# Patient Record
Sex: MALE | Race: WHITE
[De-identification: names, ages, dates, MRNs, and addresses within clinical notes are randomized per-mention and may not be internally consistent; named-entity substitution may affect disease eponyms.]

---

## 2017-01-09 ENCOUNTER — HOSPITAL ENCOUNTER (EMERGENCY)
Dept: HOSPITAL 25 - UCEAST | Age: 47
Discharge: ANOTHER HOSPITAL | End: 2017-01-09
Payer: COMMERCIAL

## 2017-01-09 ENCOUNTER — HOSPITAL ENCOUNTER (EMERGENCY)
Dept: HOSPITAL 25 - ED | Age: 47
Discharge: HOME | End: 2017-01-09
Payer: COMMERCIAL

## 2017-01-09 VITALS — SYSTOLIC BLOOD PRESSURE: 103 MMHG | DIASTOLIC BLOOD PRESSURE: 61 MMHG

## 2017-01-09 VITALS — DIASTOLIC BLOOD PRESSURE: 87 MMHG | SYSTOLIC BLOOD PRESSURE: 142 MMHG

## 2017-01-09 DIAGNOSIS — R06.02: ICD-10-CM

## 2017-01-09 DIAGNOSIS — R20.0: ICD-10-CM

## 2017-01-09 DIAGNOSIS — R07.89: Primary | ICD-10-CM

## 2017-01-09 DIAGNOSIS — F41.9: ICD-10-CM

## 2017-01-09 DIAGNOSIS — R20.2: ICD-10-CM

## 2017-01-09 DIAGNOSIS — R07.9: Primary | ICD-10-CM

## 2017-01-09 LAB
ALBUMIN SERPL BCG-MCNC: 4.3 G/DL (ref 3.2–5.2)
ALP SERPL-CCNC: 65 U/L (ref 34–104)
ALT SERPL W P-5'-P-CCNC: 53 U/L (ref 7–52)
ANION GAP SERPL CALC-SCNC: 7 MMOL/L (ref 2–11)
AST SERPL-CCNC: 37 U/L (ref 13–39)
BUN SERPL-MCNC: 14 MG/DL (ref 6–24)
BUN/CREAT SERPL: 12.3 (ref 8–20)
CALCIUM SERPL-MCNC: 9.5 MG/DL (ref 8.6–10.3)
CHLORIDE SERPL-SCNC: 103 MMOL/L (ref 101–111)
GLOBULIN SER CALC-MCNC: 2.6 G/DL (ref 2–4)
GLUCOSE SERPL-MCNC: 88 MG/DL (ref 70–100)
HCO3 SERPL-SCNC: 27 MMOL/L (ref 22–32)
HCT VFR BLD AUTO: 49 % (ref 42–52)
HGB BLD-MCNC: 16.3 G/DL (ref 14–18)
MCH RBC QN AUTO: 30 PG (ref 27–31)
MCHC RBC AUTO-ENTMCNC: 34 G/DL (ref 31–36)
MCV RBC AUTO: 88 FL (ref 80–94)
POTASSIUM SERPL-SCNC: 4.2 MMOL/L (ref 3.5–5)
PROT SERPL-MCNC: 6.9 G/DL (ref 6.4–8.9)
RBC # BLD AUTO: 5.52 10^6/UL (ref 4–5.4)
SODIUM SERPL-SCNC: 137 MMOL/L (ref 133–145)
TROPONIN I SERPL-MCNC: 0.01 NG/ML (ref ?–0.04)
WBC # BLD AUTO: 7.1 10^3/UL (ref 3.5–10.8)

## 2017-01-09 PROCEDURE — 83605 ASSAY OF LACTIC ACID: CPT

## 2017-01-09 PROCEDURE — G0463 HOSPITAL OUTPT CLINIC VISIT: HCPCS

## 2017-01-09 PROCEDURE — 85025 COMPLETE CBC W/AUTO DIFF WBC: CPT

## 2017-01-09 PROCEDURE — 36415 COLL VENOUS BLD VENIPUNCTURE: CPT

## 2017-01-09 PROCEDURE — 80053 COMPREHEN METABOLIC PANEL: CPT

## 2017-01-09 PROCEDURE — 99213 OFFICE O/P EST LOW 20 MIN: CPT

## 2017-01-09 PROCEDURE — 99283 EMERGENCY DEPT VISIT LOW MDM: CPT

## 2017-01-09 PROCEDURE — 71010: CPT

## 2017-01-09 PROCEDURE — 84484 ASSAY OF TROPONIN QUANT: CPT

## 2017-01-09 PROCEDURE — 93005 ELECTROCARDIOGRAM TRACING: CPT

## 2017-01-09 NOTE — UC
Cardiac HPI





- HPI Summary


HPI Summary: 





patient comes in with chest pressure and pain on left side, complains of left 

arm numbness and tingling. some tingling along the jaw line as well. on and off 

for the past few days. HX of anxiety but states this is different. EKG has some 

abnormalities. 





- History of Current Complaint


Chief Complaint: UCChestPain


Stated Complaint: CHEST PAIN


Time Seen by Provider: 01/09/17 16:50


Hx Obtained From: Patient


Onset/Duration: Sudden Onset, Lasting Days


Timing: Intermittent Episodes Lasting: - on and off for a few days


Initial Severity: Mild


Current Severity: Moderate


Pain Intensity: 6


Chest Pain Location: Left Anterior, Left Lateral


Character: Pressure/Squeezing


Aggravating: Nothing


Alleviating: Nothing


Associated Signs & Symptoms: Positive: Chest Pain, Anxiety, Numbness, Tingling





- Risk Factors


Pulmonary Embolism Risk Factors: Negative


Cardiac Risk Factors: Hypertension, Family History


Atrial Fibrillation: Negative


TAD Risk Factors: Negative


AMI/ACS Risk Factors: Obesity, Dyslipidemia





- Allergy/Home Medications


Allergies/Adverse Reactions: 


 Allergies











Allergy/AdvReac Type Severity Reaction Status Date / Time


 


No Known Allergies Allergy   Verified 12/19/16 10:01














PMH/Surg Hx/FS Hx/Imm Hx


Previously Healthy: Yes


Endocrine History Of: 


   Denies: Diabetes, Thyroid Disease


Cardiovascular History Of: 


   Denies: Cardiac Disorders - negative cardiac work up, Hypertension, Pacemaker

/ICD


Respiratory History Of: 


   Denies: COPD, Asthma


GI/ History Of: Reports: Kidney Stones


   Denies: Ulcer


Neurological History Of: Reports: Migraine


Psychological History Of: Reports: Anxiety, Depression





- Surgical History


Surgical History: Yes


Surgery Procedure, Year, and Place: left knee arthoscopy; wisdom teeth; left 

ureteral stent for renal calculus.  ***METALLIC ARTIFACT ADJACENT TO RT FRONTAL 

BONE (SKULL) CLEARED.  ***BY DR HERNANDEZ VIA SKULL X-RAYS 9/29/15.





- Family History


Known Family History: Positive: Cardiac Disease, Hypertension





- Social History


Alcohol Use: None


Substance Use Type: None


Smoking Status (MU): Never Smoked Tobacco


Have You Smoked in the Last Year: No





- Immunization History


Most Recent Influenza Vaccination: 10/2013


Most Recent Tetanus Shot: 10 years ago


Most Recent Pneumonia Vaccination: none





Review of Systems


Constitutional: Negative


Skin: Negative


Eyes: Negative


ENT: Negative


Respiratory: Negative


Cardiovascular: Chest Pain


Gastrointestinal: Negative


Genitourinary: Negative


Motor: Negative


Neurovascular: Negative


Musculoskeletal: Negative


Neurological: Paresthesia - lef side of jaw line and left arm


Psychological: Anxious


All Other Systems Reviewed And Are Negative: Yes





Physical Exam


Triage Information Reviewed: Yes


Appearance: Ill-Appearing, Pain Distress, Obese


Vital Signs: 


 Initial Vital Signs











Temp  98.3 F   01/09/17 16:38


 


Pulse  89   01/09/17 16:38


 


Resp  18   01/09/17 16:38


 


BP  142/87   01/09/17 16:38


 


Pulse Ox  96   01/09/17 16:38











Vital Signs Reviewed: Yes


Eye Exam: Normal


Eyes: Positive: Conjunctiva Clear


ENT Exam: Normal


ENT: Positive: Normal ENT inspection, Pharynx normal, TMs normal


Dental Exam: Normal


Neck exam: Normal


Neck: Positive: Supple, Nontender, No Lymphadenopathy


Respiratory Exam: Normal


Respiratory: Positive: Chest non-tender, Lungs clear, Normal breath sounds


Cardiovascular Exam: Normal


Cardiovascular: Positive: RRR, No Murmur, Pulses Normal


Abdominal Exam: Normal


Abdomen Description: Positive: Nontender, No Organomegaly, Soft


Bowel Sounds: Positive: Present


Musculoskeletal Exam: Normal


Musculoskeletal: Positive: Strength Intact, ROM Intact, No Edema


Neurological Exam: Normal


Neurological: Positive: Alert, Muscle Tone Normal


Psychological Exam: Normal


Skin Exam: Normal - Pedal pulses +2,


Skin: Positive: Other - slightly clammy noted on palpation





- Assessment/Plan


Course Of Treatment: hx obtained, exam performed, ekg obtained, 4 ASA 81 given, 

patient agreed to go by ambulance to [facility] ER. dr ramirez also consulted and she 

is in agreement. she called  to give report to ER.





- Differential Diagnoses - Chest Pain


Differential Diagnosis/HQI/PQRI: Acute MI, Aortic Aneurysm, Chest Wall, Lower 

Respiratory Infection, Pulmonary Edema





- Differential Diagnoses - Hypertension


Differential Diagnosis/HQI PQRI: Hypertension





- Differential Diagnoses - Palpitations


Differential Diagnosis/HQI/PQRI: Coronary Artery Disease, Panic Disorder





- Clinical Impression


Provider Diagnoses: Left sided chest pain.  anxiety





Discharge





- Discharge Plan


Condition: Stable


Disposition: TRANS [facility] CARE FAC


Referrals: 


Valentin Grewal MD [Primary Care Provider] -

## 2017-01-09 NOTE — RAD
Indication: Left leg pain.



Duplex Doppler sonography of the deep venous system of the left lower extremity deep

venous system was performed.



Bilaterally the common femoral veins appear patent and compressible. Left proximal greater

saphenous vein, proximal deep femoral vein, femoral vein, popliteal vein, posterior tibial

veins and peroneal veins appear patent and compressible.   



IMPRESSION: NO EVIDENCE OF DEEP VENOUS THROMBOSIS IS IDENTIFIED.

## 2017-01-09 NOTE — RAD
Indication: Chest pain.



Single frontal view of the chest performed at 1836 hours was reviewed.



Comparison is made with previous exam dated September 29, 2015.



No mediastinal shift is noted. Heart is of normal size and configuration. Lung fields

appear clear.



IMPRESSION: NO ACTIVE CARDIOPULMONARY DISEASE IS NOTED.

## 2017-01-09 NOTE — ED
Robert HATHAWAY Michael, scribed for Kylee Velazquez MD on 17 at 1822 .





HPI Chest Pain





- HPI Summary


HPI Summary: 





47 y/o male was BIBA from [facility] due to chest pain that started this 

afternoon at 1200. The pt describes the constant chest pain as throbbing that 

radiates down his LLE. He also states the pain was worse at 1630 as an 8 out of 

10 on a pain scale and is aggravated by nothing. Currently, the pain is at a 6 

out of 10 on the pain scale. He also c/o numbness in the hands and toes of the 

LLE. The pt is positive for 3 episodes of LLE cramping for the past 3 weeks and 

SOB. The pt has a hx of panic attacks.   





- History of Current Complaint


Chief Complaint: EDChestPainROMI


Time Seen by Provider: 17 17:42


Hx Obtained From: Patient, EMS, Medical Records


Onset/Duration: Started Hours Ago, Still Present


Time of Onset: 12:00


Timing: Constant, Lasting Hours


Initial Severity: Moderate


Current Severity: Moderate


Pain Intensity: 6


Pain Scale Used: 0-10 Numeric


Chest Pain Location: Left Lateral


Chest Pain Radiates: Yes


Chest Pain Radiates To:: Arm - LUE


Character: Other: - throbbing


Aggravating Factor(s): Nothing


Associated Signs and Symptoms: Positive: Chest Pain, Numbness, Shortness of 

Breath, Other: - LLE cramping





- Allergy/Home Medications


Allergies/Adverse Reactions: 


 Allergies











Allergy/AdvReac Type Severity Reaction Status Date / Time


 


No Known Allergies Allergy   Verified 16 10:01














PMH/Surg Hx/FS Hx/Imm Hx


Endocrine/Hematology History: 


   Denies: Hx Diabetes, Hx Thyroid Disease, Hx Anemia, Hx Unexplained Bleeding


Cardiovascular History: Reports: Hx Angina, Hx Syncope, Other Cardiovascular 

Problems/Disorders - cardiac cath  - no blockages


   Denies: Hx Aneurysm, Hx Embolism, Hx Hypercholesterolemia, Hx Hypotension, 

Hx Hypertension, Hx Pacemaker/ICD, Hx Peripheral Vascular Disease, Hx Rheumatic 

Fever, Hx Valvular Heart Disease


Respiratory History: Reports: Hx Sleep Apnea


   Denies: Hx Asthma, Hx Chronic Obstructive Pulmonary Disease (COPD)


GI History: Reports: Hx Gastroesophageal Reflux Disease


   Denies: Hx Ulcer


 History: Reports: Hx Kidney Stones


Musculoskeletal History: Reports: Hx Back Problems, Hx Gout


Sensory History: Reports: Other Sensory Impairments - finger tingling histoy 

carpal tunnel


   Denies: Hx Hearing Aid


Opthamlomology History: Reports: Other Sensory Impairments - finger tingling 

histoy carpal tunnel


Neurological History: Reports: Hx Migraine, Other Neuro Impairments/Disorders - 

PAIN CLINIC PATIENT


Psychiatric History: Reports: Hx Anxiety, Hx Depression


   Denies: Hx Panic Disorder





- Surgical History


Surgery Procedure, Year, and Place: left knee arthoscopy; wisdom teeth; left 

ureteral stent for renal calculus.  ***METALLIC ARTIFACT ADJACENT TO RT FRONTAL 

BONE (SKULL) CLEARED.  ***BY DR HERNANDEZ VIA SKULL X-RAYS 9/29/15.


Hx Anesthesia Reactions: No





- Immunization History


Date of Tetanus Vaccine: Unsure


Date of Influenza Vaccine: 


Infectious Disease History: No


Infectious Disease History: 


   Denies: Hx Hepatitis, Hx Human Immunodeficiency Virus (HIV), Traveled 

Outside the [place] in Last 30 Days





- Family History


Known Family History: 


   Negative: Cardiac Disease, Hypertension





- Social History


Occupation: Employed Full-time


Lives: With Family


Alcohol Use: None


Hx Substance Use: No


Substance Use Type: Reports: None


Hx Tobacco Use: No


Smoking Status (MU): Never Smoked Tobacco


Have You Smoked in the Last Year: No





Review of Systems


Negative: Fever


Positive: Chest Pain


Positive: Shortness Of Breath


Positive: Other - LLE cramping


Positive: Numbness


All Other Systems Reviewed And Are Negative: Yes





Physical Exam


Triage Information Reviewed: Yes


Vital Signs On Initial Exam: 


 Initial Vitals











Temp Pulse Resp BP Pulse Ox


 


 99.6 F   80   16   128/82   95 


 


 17 17:37  17 17:37  17 17:37  17 17:37  17 17:37











Vital Signs Reviewed: Yes


Appearance: Positive: Well-Appearing, No Pain Distress


Skin: Positive: Warm, Skin Color Reflects Adequate Perfusion, Dry


Eyes: Positive: EOMI, CHRISTINA


ENT: Positive: Pharynx normal, TMs normal


Neck: Positive: Supple, Nontender


Respiratory/Lung Sounds: Positive: Clear to Auscultation, Breath Sounds 

Present.  Negative: Rales, Rhonchi, Wheezes


Cardiovascular: Positive: RRR, Other - no gallop.  Negative: Murmur, Rub


Abdomen Description: Positive: Nontender, Soft, Other: - no rebound.  Negative: 

Distended, Guarding


Bowel Sounds: Positive: Present


Musculoskeletal: Positive: Strength/ROM Intact.  Negative: Edema Left, Edema 

Right


Neurological: Positive: Sensory/Motor Intact, Alert, Oriented to Person Place, 

Time, CN Intact II-III


Psychiatric: Positive: Affect/Mood Appropriate





Diagnostics





- Vital Signs


 Vital Signs











  Temp Pulse Resp BP Pulse Ox


 


 17 17:37  99.6 F  80  16  128/82  95














- Laboratory


Lab Results: 


 Lab Results











  17 Range/Units





  17:45 17:45 17:45 


 


WBC  7.1    (3.5-10.8)  10^3/ul


 


RBC  5.52 H    (4.0-5.4)  10^6/ul


 


Hgb  16.3    (14.0-18.0)  g/dl


 


Hct  49    (42-52)  %


 


MCV  88    (80-94)  fL


 


MCH  30    (27-31)  pg


 


MCHC  34    (31-36)  g/dl


 


RDW  15    (10.5-15)  %


 


Plt Count  225    (150-450)  10^3/ul


 


MPV  8    (7.4-10.4)  um3


 


Neut % (Auto)  56.9    (38-83)  %


 


Lymph % (Auto)  35.0    (25-47)  %


 


Mono % (Auto)  6.7    (1-9)  %


 


Eos % (Auto)  0.8    (0-6)  %


 


Baso % (Auto)  0.6    (0-2)  %


 


Absolute Neuts (auto)  4.1    (1.5-7.7)  10^3/ul


 


Absolute Lymphs (auto)  2.5    (1.0-4.8)  10^3/ul


 


Absolute Monos (auto)  0.5    (0-0.8)  10^3/ul


 


Absolute Eos (auto)  0.1    (0-0.6)  10^3/ul


 


Absolute Basos (auto)  0    (0-0.2)  10^3/ul


 


Absolute Nucleated RBC  0.01    10^3/ul


 


Nucleated RBC %  0.2    


 


Sodium   137   (133-145)  mmol/L


 


Potassium   4.2   (3.5-5.0)  mmol/L


 


Chloride   103   (101-111)  mmol/L


 


Carbon Dioxide   27   (22-32)  mmol/L


 


Anion Gap   7   (2-11)  mmol/L


 


BUN   14   (6-24)  mg/dL


 


Creatinine   1.14   (0.67-1.17)  mg/dL


 


Est GFR ( Amer)   89.7   (>60)  


 


Est GFR (Non-Af Amer)   69.8   (>60)  


 


BUN/Creatinine Ratio   12.3   (8-20)  


 


Glucose   88   ()  mg/dL


 


Lactic Acid    1.2  (0.5-2.0)  mmol/L


 


Calcium   9.5   (8.6-10.3)  mg/dL


 


Total Bilirubin   0.40   (0.2-1.0)  mg/dL


 


AST   37   (13-39)  U/L


 


ALT   53 H   (7-52)  U/L


 


Alkaline Phosphatase   65   ()  U/L


 


Troponin I   0.01   (<0.04)  ng/mL


 


Total Protein   6.9   (6.4-8.9)  g/dL


 


Albumin   4.3   (3.2-5.2)  g/dL


 


Globulin   2.6   (2-4)  g/dL


 


Albumin/Globulin Ratio   1.7   (1-3)  














  17 Range/Units





  22:10 


 


WBC   (3.5-10.8)  10^3/ul


 


RBC   (4.0-5.4)  10^6/ul


 


Hgb   (14.0-18.0)  g/dl


 


Hct   (42-52)  %


 


MCV   (80-94)  fL


 


MCH   (27-31)  pg


 


MCHC   (31-36)  g/dl


 


RDW   (10.5-15)  %


 


Plt Count   (150-450)  10^3/ul


 


MPV   (7.4-10.4)  um3


 


Neut % (Auto)   (38-83)  %


 


Lymph % (Auto)   (25-47)  %


 


Mono % (Auto)   (1-9)  %


 


Eos % (Auto)   (0-6)  %


 


Baso % (Auto)   (0-2)  %


 


Absolute Neuts (auto)   (1.5-7.7)  10^3/ul


 


Absolute Lymphs (auto)   (1.0-4.8)  10^3/ul


 


Absolute Monos (auto)   (0-0.8)  10^3/ul


 


Absolute Eos (auto)   (0-0.6)  10^3/ul


 


Absolute Basos (auto)   (0-0.2)  10^3/ul


 


Absolute Nucleated RBC   10^3/ul


 


Nucleated RBC %   


 


Sodium   (133-145)  mmol/L


 


Potassium   (3.5-5.0)  mmol/L


 


Chloride   (101-111)  mmol/L


 


Carbon Dioxide   (22-32)  mmol/L


 


Anion Gap   (2-11)  mmol/L


 


BUN   (6-24)  mg/dL


 


Creatinine   (0.67-1.17)  mg/dL


 


Est GFR ( Amer)   (>60)  


 


Est GFR (Non-Af Amer)   (>60)  


 


BUN/Creatinine Ratio   (8-20)  


 


Glucose   ()  mg/dL


 


Lactic Acid   (0.5-2.0)  mmol/L


 


Calcium   (8.6-10.3)  mg/dL


 


Total Bilirubin   (0.2-1.0)  mg/dL


 


AST   (13-39)  U/L


 


ALT   (7-52)  U/L


 


Alkaline Phosphatase   ()  U/L


 


Troponin I  0.00  (<0.04)  ng/mL


 


Total Protein   (6.4-8.9)  g/dL


 


Albumin   (3.2-5.2)  g/dL


 


Globulin   (2-4)  g/dL


 


Albumin/Globulin Ratio   (1-3)  











Result Diagrams: 


 17 17:45





 17 17:45


Lab Statement: Any lab studies that have been ordered have been reviewed, and 

results considered in the medical decision making process.





- Radiology


  ** CXR


Xray Interpretation: No Acute Changes


Radiology Interpretation Completed By: Radiologist





- EKG


  ** EK


EKG Rhythm: Sinus Rhythm


ST Segment: Normal





- Additional Comments


Diagnostic Additional Comments: 





US:Radiologist-no evidence for DVT. 





Chest Pain Course/Dx





- Course


Course Of Treatment: 47 y/o male comes to the ED from [facility] c/o chest 

pain. He also c/o numbness in his hands and toes on his LLE. The pt also c/o 3 

epsisodes of LLE cramping for the last 3 weeks. His EKG-nml, CXR-nml, and US 

was nml.  No risk factors ok to go after 2nd trop (6 hours from worsened pain) 

which was zero





- Diagnoses


Provider Diagnoses: 


 Chest pain








Discharge





- Discharge Plan


Condition: Stable


Disposition: HOME


Patient Education Materials:  Chest Pain (ED)


Referrals: 


Valentin Grewal MD [Primary Care Provider] - 


Additional Instructions: 


you will follow up with Dr. Carmina within the next 2 days.





The documentation as recorded by the scribeRobert Michael accurately 

reflects the service I personally performed and the decisions made by me, 

Kylee Velazquez MD.

## 2017-01-10 NOTE — UC
I, Renato Castelan, scribed for Tana Kline MD on 01/09/17 at 1709 .





Progress





- Progress Note


Progress Note: 


Pt reported pressure pain over left side of heart since noon and rated it at a 5

-6/10. Thinks is coming from anxiety, which he has had before, but this feels a 

little different.  States the pain radiates to his jaw and his left arm.  

Nonsmoker, no prior hx CAD.  Risk factors: dyslipidemia, HTN, fam hx.  Pt 

advised of abnormal EKG with q in III, poor R wave progression V1V2.  Pt needs 

higher level of care and is [place] [facility] [place].





The documentation as recorded by the scribeFlip Karl accurately reflects 

the service I personally performed and the decisions made by me, Tana Kline MD.

## 2017-02-22 ENCOUNTER — HOSPITAL ENCOUNTER (EMERGENCY)
Dept: HOSPITAL 25 - ED | Age: 47
Discharge: HOME | End: 2017-02-22
Payer: COMMERCIAL

## 2017-02-22 VITALS — DIASTOLIC BLOOD PRESSURE: 89 MMHG | SYSTOLIC BLOOD PRESSURE: 109 MMHG

## 2017-02-22 DIAGNOSIS — S00.81XA: Primary | ICD-10-CM
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
## 2017-02-22 PROCEDURE — 99282 EMERGENCY DEPT VISIT SF MDM: CPT

## 2017-02-22 PROCEDURE — 70450 CT HEAD/BRAIN W/O DYE: CPT

## 2017-02-22 PROCEDURE — 70486 CT MAXILLOFACIAL W/O DYE: CPT

## 2017-02-22 NOTE — RAD
HISTORY: Trauma to right face



COMPARISONS: September 29, 2015



TECHNIQUE: Multiple contiguous axial CT scans were obtained of the head without 

intravenous contrast. 



FINDINGS: 

HEMORRHAGE/INFARCT: There is no hemorrhage or acute infarct.

MASSES/SHIFT: There is no mass or shift.



EXTRA-AXIAL SPACES: There are no extra-axial fluid collections.

SULCI AND VENTRICLES: The sulci and ventricles are normal in size and position for the

patient's stated age.



CEREBRUM: There are no focal parenchymal abnormalities.

BRAINSTEM: There are no focal parenchymal abnormalities.

CEREBELLUM: There are no focal parenchymal abnormalities.



VESSELS: The vessels are grossly normal.

PARANASAL SINUSES: The paranasal sinuses are clear.

ORBITS: The orbits are unremarkable.

BONES AND SOFT TISSUE: There is a small radial opaque foreign body in the subcutaneous

soft tissue along the infratemporal fossa on the right. This is stable from the 2015

examination.



OTHER: None



IMPRESSION: 

NO ACUTE INTRACRANIAL PATHOLOGY.

## 2017-02-22 NOTE — ED
Head Injury





- HPI Summary


HPI Summary: 


Patient was at work when a 3/8 inch chain on a piece of equipment snapped and 

recoiled, hitting him on the right side of his face. The force knocked him to 

the ground, but he did not loose consciousness. He denies HA, neck or jaw pain, 

and no loose teeth. He has an abrasion on his right cheek and has swelling 

around his ear. He has not taken any medication for pain.





- History Of Current Complaint


Chief Complaint: EDFacialInjury


Stated Complaint: FACIAL INJURY


Time Seen by Provider: 02/22/17 08:07


Hx Obtained From: Patient


Mechanism Of Injury: Blunt Trauma


Onset/Duration: Started Minutes Ago


Onset of Pain: Immediate


Severity Currently: Severe


Severity Initially: Moderate


Pain Intensity: 5


Location of Head Injury: Temporal - right


Character: Sharp, Aching


Aggravating Factor(s): Movement


Associated Signs And Symptoms: Other: - abrasion right cheek





- Allergies/Home Medications


Allergies/Adverse Reactions: 


 Allergies











Allergy/AdvReac Type Severity Reaction Status Date / Time


 


No Known Allergies Allergy   Verified 12/19/16 10:01














PMH/Surg Hx/FS Hx/Imm Hx


Endocrine/Hematology History: 


   Denies: Hx Diabetes, Hx Thyroid Disease, Hx Anemia, Hx Unexplained Bleeding


Cardiovascular History: Reports: Hx Angina, Hx Syncope, Other Cardiovascular 

Problems/Disorders - cardiac cath 2013 - no blockages


   Denies: Hx Aneurysm, Hx Embolism, Hx Hypercholesterolemia, Hx Hypotension, 

Hx Hypertension, Hx Pacemaker/ICD, Hx Peripheral Vascular Disease, Hx Rheumatic 

Fever, Hx Valvular Heart Disease


Respiratory History: Reports: Hx Sleep Apnea


   Denies: Hx Asthma, Hx Chronic Obstructive Pulmonary Disease (COPD)


GI History: Reports: Hx Gastroesophageal Reflux Disease


   Denies: Hx Ulcer


 History: Reports: Hx Kidney Stones


Musculoskeletal History: Reports: Hx Back Problems, Hx Gout


Sensory History: Reports: Other Sensory Impairments - finger tingling histoy 

carpal tunnel


   Denies: Hx Hearing Aid


Opthamlomology History: Reports: Other Sensory Impairments - finger tingling 

histoy carpal tunnel


Neurological History: Reports: Hx Migraine, Other Neuro Impairments/Disorders - 

PAIN CLINIC PATIENT


Psychiatric History: Reports: Hx Anxiety, Hx Depression


   Denies: Hx Panic Disorder





- Surgical History


Surgery Procedure, Year, and Place: left knee arthoscopy; wisdom teeth; left 

ureteral stent for renal calculus.  ***METALLIC ARTIFACT ADJACENT TO RT FRONTAL 

BONE (SKULL) CLEARED.  ***BY DR HERNANDEZ VIA SKULL X-RAYS 9/29/15.


Hx Anesthesia Reactions: No





- Immunization History


Date of Tetanus Vaccine: Unsure


Date of Influenza Vaccine: 2013


Infectious Disease History: No


Infectious Disease History: 


   Denies: Hx Hepatitis, Hx Human Immunodeficiency Virus (HIV), Traveled 

Outside the [place] in Last 30 Days





- Family History


Known Family History: Positive: None


   Negative: Cardiac Disease, Hypertension





- Social History


Occupation: Employed Full-time


Lives: With Family


Alcohol Use: None


Hx Substance Use: No


Substance Use Type: Reports: None


Hx Tobacco Use: No


Smoking Status (MU): Never Smoked Tobacco


Have You Smoked in the Last Year: No





Review of Systems


Negative: Photophobia, Blurred Vision, Diplopia


Positive: Edema - mild to right cheek


Positive: Other - minor abrasion to right cheek.  Negative: Bruising


Negative: Headache, Weakness, Paresthesia


All Other Systems Reviewed And Are Negative: Yes





Physical Exam


Triage Information Reviewed: Yes


Vital Signs On Initial Exam: 


 Initial Vitals











Temp Pulse Resp BP Pulse Ox


 


 98.9 F   84   20   127/82   96 


 


 02/22/17 07:57  02/22/17 07:57  02/22/17 07:57  02/22/17 07:57  02/22/17 07:57











Vital Signs Reviewed: Yes


Appearance: Positive: Well-Appearing, Pain Distress, Obese


Skin: Positive: Warm, Skin Color Reflects Adequate Perfusion, Dry, Tender - 

right cheek abrasion with mild edema over cheek and superior to right ear, Soft


Head/Face: Positive: Normal Head/Face Inspection


Eyes: Positive: EOMI, CHRISTINA, Conjunctiva Clear


ENT: Positive: Hearing grossly normal, Pharynx normal, TMs normal


Neck: Positive: Supple, Nontender, No Lymphadenopathy


Respiratory/Lung Sounds: Positive: Clear to Auscultation, Breath Sounds Present


Cardiovascular: Positive: RRR


Musculoskeletal: Positive: Strength/ROM Intact


Neurological: Positive: Sensory/Motor Intact, Alert, Oriented to Person Place, 

Time, CN Intact II-III, NV Bundle Intact Distally


Psychiatric: Positive: Affect/Mood Appropriate


AVPU Assessment: Alert





- [place] Coma Scale


Coma Scale Total: 15





Diagnostics





- Vital Signs


 Vital Signs











  Temp Pulse Resp BP Pulse Ox


 


 02/22/17 07:57  98.9 F  84  20  127/82  96














- Laboratory


Lab Statement: Any lab studies that have been ordered have been reviewed, and 

results considered in the medical decision making process.





- CT


  ** No standard instances


CT Interpretation: No Acute Changes - CT brain and CT maxillofacial negative 

for acute changes.


CT Interpretation Completed By: Radiologist





Head Injury Course/Dx





- Diagnoses


Differential Diagnosis/HQI/PQRI: Cerebral Contusion, Cervical Sprain, Hematoma, 

Intracranial Bleed, Laceration, Mandible Fracture, Skull Fracture


Provider Diagnoses: 


 Contusion of mandibular joint area








Discharge





- Discharge Plan


Condition: Stable


Disposition: HOME


Patient Education Materials:  Facial Contusion (ED)


Referrals: 


Valentin Grewal MD [Primary Care Provider] - 


Additional Instructions: 


Please use ibuprofen 600mg three times daily with meals for the next 3-5 days 

to decrease swelling and pain. Apply ice several times daily as needed to 

reduce swelling. Follow-up with your primary care provider if your symptoms do 

not begin to improve in the next 5-7 days. Return to the emergency department 

if symptoms worsen.

## 2017-02-22 NOTE — RAD
HISTORY: Right facial trauma



COMPARISONS: Head CT dated September 29, 2015



TECHNIQUE: Multiple contiguous axial CT scans were obtained of the face without

intravenous contrast, with coronal and sagittal multiplanar reformations.    



FINDINGS: 



BONES: There is no displaced fracture or dislocation. The orbital rim is intact. The

zygomatic arch is intact. The pterygoid plates are intact.



ORBITS: The globes are round. The optic nerves are symmetric. The extraocular musculature

is normal. There is no post septal or intraconal inflammatory change. There is no

retrobulbar hematoma.



PARANASAL SINUSES: The paranasal sinuses are clear.



BRAIN AND SOFT TISSUE: Again noted is a smaller opaque foreign body in the subcutaneous

soft tissue along the infratemporal fossa on the right, stable from previous examinations.



OTHER: None.



IMPRESSION: 

NO FACIAL FRACTURE

## 2017-07-07 ENCOUNTER — HOSPITAL ENCOUNTER (EMERGENCY)
Dept: HOSPITAL 25 - ED | Age: 47
Discharge: HOME | End: 2017-07-07
Payer: COMMERCIAL

## 2017-07-07 VITALS — SYSTOLIC BLOOD PRESSURE: 133 MMHG | DIASTOLIC BLOOD PRESSURE: 89 MMHG

## 2017-07-07 DIAGNOSIS — R11.0: ICD-10-CM

## 2017-07-07 DIAGNOSIS — R07.9: Primary | ICD-10-CM

## 2017-07-07 LAB
ALBUMIN SERPL BCG-MCNC: 4.2 G/DL (ref 3.2–5.2)
ALP SERPL-CCNC: 67 U/L (ref 34–104)
ALT SERPL W P-5'-P-CCNC: 58 U/L (ref 7–52)
ANION GAP SERPL CALC-SCNC: 7 MMOL/L (ref 2–11)
AST SERPL-CCNC: 48 U/L (ref 13–39)
BUN SERPL-MCNC: 13 MG/DL (ref 6–24)
BUN/CREAT SERPL: 12.3 (ref 8–20)
CALCIUM SERPL-MCNC: 9.2 MG/DL (ref 8.6–10.3)
CHLORIDE SERPL-SCNC: 104 MMOL/L (ref 101–111)
GLOBULIN SER CALC-MCNC: 2.8 G/DL (ref 2–4)
GLUCOSE SERPL-MCNC: 94 MG/DL (ref 70–100)
HCO3 SERPL-SCNC: 25 MMOL/L (ref 22–32)
HCT VFR BLD AUTO: 50 % (ref 42–52)
HGB BLD-MCNC: 16.5 G/DL (ref 14–18)
MCH RBC QN AUTO: 30 PG (ref 27–31)
MCHC RBC AUTO-ENTMCNC: 33 G/DL (ref 31–36)
MCV RBC AUTO: 91 FL (ref 80–94)
POTASSIUM SERPL-SCNC: 4 MMOL/L (ref 3.5–5)
PROT SERPL-MCNC: 7 G/DL (ref 6.4–8.9)
RBC # BLD AUTO: 5.48 10^6/UL (ref 4–5.4)
SODIUM SERPL-SCNC: 136 MMOL/L (ref 133–145)
TROPONIN I SERPL-MCNC: 0 NG/ML (ref ?–0.04)
WBC # BLD AUTO: 7.2 10^3/UL (ref 3.5–10.8)

## 2017-07-07 PROCEDURE — 80053 COMPREHEN METABOLIC PANEL: CPT

## 2017-07-07 PROCEDURE — 71010: CPT

## 2017-07-07 PROCEDURE — 85379 FIBRIN DEGRADATION QUANT: CPT

## 2017-07-07 PROCEDURE — 84484 ASSAY OF TROPONIN QUANT: CPT

## 2017-07-07 PROCEDURE — 93005 ELECTROCARDIOGRAM TRACING: CPT

## 2017-07-07 PROCEDURE — 99284 EMERGENCY DEPT VISIT MOD MDM: CPT

## 2017-07-07 PROCEDURE — 36415 COLL VENOUS BLD VENIPUNCTURE: CPT

## 2017-07-07 PROCEDURE — 83605 ASSAY OF LACTIC ACID: CPT

## 2017-07-07 PROCEDURE — 85025 COMPLETE CBC W/AUTO DIFF WBC: CPT

## 2017-07-07 NOTE — RAD
Indication: Chest pain.



Single frontal view of the chest performed at 1500 hours was reviewed.



Comparison is made with previous exam dated January 09, 2017.



No mediastinal shift is noted. Heart is of normal size and configuration. Lung fields

appear clear.



IMPRESSION: NO ACTIVE CARDIOPULMONARY DISEASE IS NOTED.

## 2017-07-07 NOTE — ED
Guillermina HATHAWAY Salem, scribed for Umesh Rangel MD on 07/07/17 at 1442 .





HPI Chest Pain





- HPI Summary


HPI Summary: 





Patient is a 45 y/o M who presents to the ED with intermittent burning and 

occasionally sharp, left-sided CP since noon today. He reports nausea PTA, but 

denies diaphoresis or SOB. He states that pain began after he ate his lunch 

which was a salad and typical for him. Pt also states that he has a hx of 

anxiety, but that sx are dissimilar. He reports he has been trying to lose 

weight so has been running on the treadmill and lifting weights daily (

including yesterday). 





- History of Current Complaint


Chief Complaint: EDChestPainROMI


Time Seen by Provider: 07/07/17 14:21


Hx Obtained From: Patient


Onset/Duration: Started Hours Ago, Atraumatic, Still Present


Timing: Intermittent


Initial Severity: Moderate


Current Severity: Moderate


Pain Intensity: 5


Pain Scale Used: 0-10 Numeric


Chest Pain Location: Left Anterior


Chest Pain Radiates: No


Character: Burning, Sharp/Stabbing


Aggravating Factor(s): Nothing


Alleviating Factor(s): Nothing


Associated Signs and Symptoms: Positive: Chest Pain, Nausea.  Negative: 

Shortness of Breath, Diaphoresis





- Allergy/Home Medications


Allergies/Adverse Reactions: 


 Allergies











Allergy/AdvReac Type Severity Reaction Status Date / Time


 


No Known Allergies Allergy   Verified 12/19/16 10:01














PMH/Surg Hx/FS Hx/Imm Hx


Endocrine/Hematology History: 


   Denies: Hx Diabetes, Hx Thyroid Disease, Hx Anemia, Hx Unexplained Bleeding


Cardiovascular History: Reports: Hx Angina, Hx Syncope, Other Cardiovascular 

Problems/Disorders - cardiac cath 2013 - no blockages


   Denies: Hx Aneurysm, Hx Embolism, Hx Hypercholesterolemia, Hx Hypotension, 

Hx Hypertension, Hx Pacemaker/ICD, Hx Peripheral Vascular Disease, Hx Rheumatic 

Fever, Hx Valvular Heart Disease


Respiratory History: Reports: Hx Sleep Apnea


   Denies: Hx Asthma, Hx Chronic Obstructive Pulmonary Disease (COPD)


GI History: Reports: Hx Gastroesophageal Reflux Disease


   Denies: Hx Ulcer


 History: Reports: Hx Kidney Stones


Musculoskeletal History: Reports: Hx Back Problems, Hx Gout


Sensory History: Reports: Other Sensory Impairments - finger tingling histoy 

carpal tunnel


   Denies: Hx Hearing Aid


Opthamlomology History: Reports: Other Sensory Impairments - finger tingling 

histoy carpal tunnel


Neurological History: Reports: Hx Migraine, Other Neuro Impairments/Disorders - 

PAIN CLINIC PATIENT


Psychiatric History: Reports: Hx Anxiety, Hx Depression


   Denies: Hx Panic Disorder





- Surgical History


Surgery Procedure, Year, and Place: left knee arthoscopy; wisdom teeth; left 

ureteral stent for renal calculus.  ***METALLIC ARTIFACT ADJACENT TO RT FRONTAL 

BONE (SKULL) CLEARED.  ***BY DR HERNANDEZ VIA SKULL X-RAYS 9/29/15.


Hx Anesthesia Reactions: No





- Immunization History


Date of Tetanus Vaccine: Unsure


Date of Influenza Vaccine: 2013


Infectious Disease History: No


Infectious Disease History: 


   Denies: Hx Hepatitis, Hx Human Immunodeficiency Virus (HIV), Traveled 

Outside the [place] in Last 30 Days





- Family History


Known Family History: 


   Negative: Cardiac Disease, Hypertension





- Social History


Alcohol Use: Occasionally


Hx Substance Use: No


Substance Use Type: Reports: None


Hx Tobacco Use: No


Smoking Status (MU): Never Smoked Tobacco


Have You Smoked in the Last Year: No





Review of Systems


Negative: Skin Diaphoresis


Positive: Chest Pain


Negative: Shortness Of Breath


Positive: Nausea


All Other Systems Reviewed And Are Negative: Yes





Physical Exam


Triage Information Reviewed: Yes


Vital Signs On Initial Exam: 


 Initial Vitals











Temp Pulse Resp BP Pulse Ox


 


 97.9 F   94   20   153/83   96 


 


 07/07/17 13:43  07/07/17 13:43  07/07/17 13:43  07/07/17 13:43  07/07/17 13:43











Vital Signs Reviewed: Yes


Appearance: Positive: Well-Appearing, No Pain Distress, Obese


Skin: Positive: Warm, Skin Color Reflects Adequate Perfusion, Dry


Head/Face: Positive: Normal Head/Face Inspection


Eyes: Positive: Normal


Neck: Positive: Supple, Nontender


Respiratory/Lung Sounds: Positive: Clear to Auscultation, Breath Sounds Present


Cardiovascular: Positive: RRR


Abdomen Description: Positive: Nontender, Soft


Bowel Sounds: Positive: Present


Musculoskeletal: Positive: Normal


Neurological: Positive: Normal


Psychiatric: Positive: Normal, Affect/Mood Appropriate





- Dwale Coma Scale


Coma Scale Total: 15





Diagnostics





- Vital Signs


 Vital Signs











  Temp Pulse Resp BP Pulse Ox


 


 07/07/17 14:30    21  130/72 


 


 07/07/17 14:29     127/67 


 


 07/07/17 13:46  97.9 F  94  20  153/83  97


 


 07/07/17 13:43  97.9 F  94  20  153/83  96














- Laboratory


Lab Results: 


 Lab Results











  07/07/17 07/07/17 07/07/17 Range/Units





  14:30 14:30 14:30 


 


WBC  7.2    (3.5-10.8)  10^3/ul


 


RBC  5.48 H    (4.0-5.4)  10^6/ul


 


Hgb  16.5    (14.0-18.0)  g/dl


 


Hct  50    (42-52)  %


 


MCV  91    (80-94)  fL


 


MCH  30    (27-31)  pg


 


MCHC  33    (31-36)  g/dl


 


RDW  15    (10.5-15)  %


 


Plt Count  205    (150-450)  10^3/ul


 


MPV  8    (7.4-10.4)  um3


 


Neut % (Auto)  70.5    (38-83)  %


 


Lymph % (Auto)  22.5 L    (25-47)  %


 


Mono % (Auto)  6.4    (1-9)  %


 


Eos % (Auto)  0.4    (0-6)  %


 


Baso % (Auto)  0.2    (0-2)  %


 


Absolute Neuts (auto)  5.1    (1.5-7.7)  10^3/ul


 


Absolute Lymphs (auto)  1.6    (1.0-4.8)  10^3/ul


 


Absolute Monos (auto)  0.5    (0-0.8)  10^3/ul


 


Absolute Eos (auto)  0    (0-0.6)  10^3/ul


 


Absolute Basos (auto)  0    (0-0.2)  10^3/ul


 


Absolute Nucleated RBC  0.01    10^3/ul


 


Nucleated RBC %  0.1    


 


D-Dimer, Quantitative   < 200   (Less Than 230)  ng/mL


 


Sodium    136  (133-145)  mmol/L


 


Potassium    4.0  (3.5-5.0)  mmol/L


 


Chloride    104  (101-111)  mmol/L


 


Carbon Dioxide    25  (22-32)  mmol/L


 


Anion Gap    7  (2-11)  mmol/L


 


BUN    13  (6-24)  mg/dL


 


Creatinine    1.06  (0.67-1.17)  mg/dL


 


Est GFR ( Amer)    97.6  (>60)  


 


Est GFR (Non-Af Amer)    75.9  (>60)  


 


BUN/Creatinine Ratio    12.3  (8-20)  


 


Glucose    94  ()  mg/dL


 


Lactic Acid     (0.5-2.0)  mmol/L


 


Calcium    9.2  (8.6-10.3)  mg/dL


 


Total Bilirubin    0.80  (0.2-1.0)  mg/dL


 


AST    48 H  (13-39)  U/L


 


ALT    58 H  (7-52)  U/L


 


Alkaline Phosphatase    67  ()  U/L


 


Troponin I    0.00  (<0.04)  ng/mL


 


Total Protein    7.0  (6.4-8.9)  g/dL


 


Albumin    4.2  (3.2-5.2)  g/dL


 


Globulin    2.8  (2-4)  g/dL


 


Albumin/Globulin Ratio    1.5  (1-3)  














  07/07/17 07/07/17 Range/Units





  14:30 17:30 


 


WBC    (3.5-10.8)  10^3/ul


 


RBC    (4.0-5.4)  10^6/ul


 


Hgb    (14.0-18.0)  g/dl


 


Hct    (42-52)  %


 


MCV    (80-94)  fL


 


MCH    (27-31)  pg


 


MCHC    (31-36)  g/dl


 


RDW    (10.5-15)  %


 


Plt Count    (150-450)  10^3/ul


 


MPV    (7.4-10.4)  um3


 


Neut % (Auto)    (38-83)  %


 


Lymph % (Auto)    (25-47)  %


 


Mono % (Auto)    (1-9)  %


 


Eos % (Auto)    (0-6)  %


 


Baso % (Auto)    (0-2)  %


 


Absolute Neuts (auto)    (1.5-7.7)  10^3/ul


 


Absolute Lymphs (auto)    (1.0-4.8)  10^3/ul


 


Absolute Monos (auto)    (0-0.8)  10^3/ul


 


Absolute Eos (auto)    (0-0.6)  10^3/ul


 


Absolute Basos (auto)    (0-0.2)  10^3/ul


 


Absolute Nucleated RBC    10^3/ul


 


Nucleated RBC %    


 


D-Dimer, Quantitative    (Less Than 230)  ng/mL


 


Sodium    (133-145)  mmol/L


 


Potassium    (3.5-5.0)  mmol/L


 


Chloride    (101-111)  mmol/L


 


Carbon Dioxide    (22-32)  mmol/L


 


Anion Gap    (2-11)  mmol/L


 


BUN    (6-24)  mg/dL


 


Creatinine    (0.67-1.17)  mg/dL


 


Est GFR ( Amer)    (>60)  


 


Est GFR (Non-Af Amer)    (>60)  


 


BUN/Creatinine Ratio    (8-20)  


 


Glucose    ()  mg/dL


 


Lactic Acid  0.7   (0.5-2.0)  mmol/L


 


Calcium    (8.6-10.3)  mg/dL


 


Total Bilirubin    (0.2-1.0)  mg/dL


 


AST    (13-39)  U/L


 


ALT    (7-52)  U/L


 


Alkaline Phosphatase    ()  U/L


 


Troponin I   0.00  (<0.04)  ng/mL


 


Total Protein    (6.4-8.9)  g/dL


 


Albumin    (3.2-5.2)  g/dL


 


Globulin    (2-4)  g/dL


 


Albumin/Globulin Ratio    (1-3)  











Result Diagrams: 


 07/07/17 14:30





 07/07/17 14:30


Diagnostic Studies Comment: Trop: 0.00.  Trop: 0.00


Lab Statement: Any lab studies that have been ordered have been reviewed, and 

results considered in the medical decision making process.





- Radiology


  ** CXR


Radiology Interpretation Completed By: Radiologist - IMPRESSION: NO ACTIVE 

CARDIOPULMONARY DISEASE IS NOTED.





- EKG


  ** 1355


EKG Interpretation: NSR @ 86 bpm. 





Re-Evaluation





- Re-Evaluation


  ** First Eval


Re-Evaluation Time: 17:32


Comment: Updated pt.





Chest Pain Course/Dx





- Course


Course Of Treatment: Mr. Pfeiffer presented with leftsided chest pain that started 

2 hours PTA.  He was worked up for CP including a d-dimer and two troponins and 

was D/C'd to F/U.





- Diagnoses


Provider Diagnoses: 


 Chest pain








Discharge





- Discharge Plan


Condition: Stable


Disposition: HOME


Patient Education Materials:  Chest Pain (ED)


Referrals: 


Valentin Grewal MD [Primary Care Provider] - 


Additional Instructions: 


Please follow up with your primary care provider. 





The documentation as recorded by the Guillermina trevizo Salem accurately reflects 

the service I personally performed and the decisions made by me, Umesh Rangel MD.

## 2018-03-20 ENCOUNTER — HOSPITAL ENCOUNTER (OUTPATIENT)
Dept: HOSPITAL 25 - ED | Age: 48
Setting detail: OBSERVATION
LOS: 1 days | Discharge: HOME | End: 2018-03-21
Attending: INTERNAL MEDICINE | Admitting: HOSPITALIST
Payer: COMMERCIAL

## 2018-03-20 DIAGNOSIS — F17.209: ICD-10-CM

## 2018-03-20 DIAGNOSIS — Z98.61: ICD-10-CM

## 2018-03-20 DIAGNOSIS — R53.1: ICD-10-CM

## 2018-03-20 DIAGNOSIS — Z87.442: ICD-10-CM

## 2018-03-20 DIAGNOSIS — E66.9: ICD-10-CM

## 2018-03-20 DIAGNOSIS — G47.33: ICD-10-CM

## 2018-03-20 DIAGNOSIS — I25.10: ICD-10-CM

## 2018-03-20 DIAGNOSIS — Z79.82: ICD-10-CM

## 2018-03-20 DIAGNOSIS — E78.5: ICD-10-CM

## 2018-03-20 DIAGNOSIS — M10.9: ICD-10-CM

## 2018-03-20 DIAGNOSIS — F41.9: ICD-10-CM

## 2018-03-20 DIAGNOSIS — K21.9: ICD-10-CM

## 2018-03-20 DIAGNOSIS — R07.9: Primary | ICD-10-CM

## 2018-03-20 LAB
BASOPHILS # BLD AUTO: 0 10^3/UL (ref 0–0.2)
EOSINOPHIL # BLD AUTO: 0 10^3/UL (ref 0–0.6)
HCT VFR BLD AUTO: 48 % (ref 42–52)
HGB BLD-MCNC: 16.3 G/DL (ref 14–18)
LYMPHOCYTES # BLD AUTO: 1.3 10^3/UL (ref 1–4.8)
MCH RBC QN AUTO: 30 PG (ref 27–31)
MCHC RBC AUTO-ENTMCNC: 34 G/DL (ref 31–36)
MCV RBC AUTO: 90 FL (ref 80–94)
MONOCYTES # BLD AUTO: 0.4 10^3/UL (ref 0–0.8)
NEUTROPHILS # BLD AUTO: 2.5 10^3/UL (ref 1.5–7.7)
NRBC # BLD AUTO: 0 10^3/UL
NRBC BLD QL AUTO: 0.1
PLATELET # BLD AUTO: 185 10^3/UL (ref 150–450)
RBC # BLD AUTO: 5.37 10^6/UL (ref 4–5.4)
WBC # BLD AUTO: 4.3 10^3/UL (ref 3.5–10.8)

## 2018-03-20 PROCEDURE — 80061 LIPID PANEL: CPT

## 2018-03-20 PROCEDURE — G0378 HOSPITAL OBSERVATION PER HR: HCPCS

## 2018-03-20 PROCEDURE — 99285 EMERGENCY DEPT VISIT HI MDM: CPT

## 2018-03-20 PROCEDURE — 80053 COMPREHEN METABOLIC PANEL: CPT

## 2018-03-20 PROCEDURE — 83605 ASSAY OF LACTIC ACID: CPT

## 2018-03-20 PROCEDURE — 78452 HT MUSCLE IMAGE SPECT MULT: CPT

## 2018-03-20 PROCEDURE — 85730 THROMBOPLASTIN TIME PARTIAL: CPT

## 2018-03-20 PROCEDURE — 84484 ASSAY OF TROPONIN QUANT: CPT

## 2018-03-20 PROCEDURE — 87502 INFLUENZA DNA AMP PROBE: CPT

## 2018-03-20 PROCEDURE — 85652 RBC SED RATE AUTOMATED: CPT

## 2018-03-20 PROCEDURE — 85379 FIBRIN DEGRADATION QUANT: CPT

## 2018-03-20 PROCEDURE — 83036 HEMOGLOBIN GLYCOSYLATED A1C: CPT

## 2018-03-20 PROCEDURE — 85025 COMPLETE CBC W/AUTO DIFF WBC: CPT

## 2018-03-20 PROCEDURE — A9502 TC99M TETROFOSMIN: HCPCS

## 2018-03-20 PROCEDURE — 93017 CV STRESS TEST TRACING ONLY: CPT

## 2018-03-20 PROCEDURE — 80048 BASIC METABOLIC PNL TOTAL CA: CPT

## 2018-03-20 PROCEDURE — 71045 X-RAY EXAM CHEST 1 VIEW: CPT

## 2018-03-20 PROCEDURE — 86140 C-REACTIVE PROTEIN: CPT

## 2018-03-20 PROCEDURE — 93005 ELECTROCARDIOGRAM TRACING: CPT

## 2018-03-20 PROCEDURE — 96374 THER/PROPH/DIAG INJ IV PUSH: CPT

## 2018-03-20 PROCEDURE — 36415 COLL VENOUS BLD VENIPUNCTURE: CPT

## 2018-03-20 RX ADMIN — HEPARIN SODIUM SCH: 5000 INJECTION INTRAVENOUS; SUBCUTANEOUS at 16:56

## 2018-03-20 RX ADMIN — HEPARIN SODIUM SCH: 5000 INJECTION INTRAVENOUS; SUBCUTANEOUS at 22:44

## 2018-03-20 NOTE — RAD
HISTORY: Chest pain



COMPARISONS: July 07, 2017



VIEWS: 1: frontal portable view of the chest at 11:46 AM



FINDINGS:

LINES AND TUBES: None.

CARDIOMEDIASTINAL SILHOUETTE: The cardiomediastinal silhouette is normal for portable

technique.

PLEURA: The costophrenic angles are sharp. No pleural abnormalities are noted.

LUNG PARENCHYMA: The lungs are clear.

ABDOMEN: The upper abdomen is clear. There is no subphrenic gas.

BONES AND SOFT TISSUES: No bone or soft tissue abnormalities are noted.



IMPRESSION: NO ACTIVE CARDIOPULMONARY DISEASE.

## 2018-03-20 NOTE — HP
CC:  Dr. Grewal *

 

HISTORY AND PHYSICAL:

 

DATE OF ADMISSION:  03/20/18

 

PRIMARY CARE PROVIDER:  Dr. Grewal.

 

ATTENDING PHYSICIAN WHILE IN THE HOSPITAL:  Dr. Nancy Torre * (report 
dictated by Ezio Hidalgo NP)

 

CHIEF COMPLAINT:

1.  Chest pain.

2.  Not feeling well.

3.  Weakness.

 

HISTORY OF PRESENT ILLNESS:  Mr. Pfeiffer is a 47-year-old male patient, who is 
relatively healthy.  He has a history of NANCY.  He is compliant with his mask.  
He also has history of GERD, gout, history of anxiety, history of 
nephrolithiasis, who presents today, says that about a month ago, he traveled 
back from [place].  He had been doing well throughout the last several weeks 
with exception in the last 24 to 48 hours, in his own words initially, he says 
he had no chest pain, he just felt, again in his own words, crappy, felt weak, 
he felt malaise.  He thought maybe he was catching a bug.  He went to bed.  He 
woke up this morning at 4:30, was going to go to work, starting his normal 
activities.  He noted that around 9:30, he started having some chest discomfort 
in the left side, just right in the left pec muscle. He thought maybe it was 
related to him lifting weights.  He does work out on a daily basis.  He 
actually ran on the treadmill yesterday and said he did fine with this.  He ran 
at 4.3 on an incline for about 45 minutes.  He never had chest discomfort with 
that.  He says that the discomfort has been coming and going, it lasts seconds 
at times and he just described it as discomfort.  I asked him if this was sharp
, stabbing, burning, or squeezing pain, and he just says that it is discomfort 
and he feels tired.  He feels weak.  He denies any associated shortness of 
breath.  Denies any calf pain, leg pain.  No swelling.  Denies having any 
vomiting or diarrhea.  No URI symptoms.  No cough.  He says the pain does not 
get worse with taking a deep breath.  He says it just has been coming and going 
for seconds at rest.  He says he really does not exert himself today, so he is 
really unsure if exertion makes it worse or not.  He came into the ED.  He was 
evaluated. He does have history of nonobstructive coronary artery disease and 
because of the chest discomfort, we were asked to evaluate for admission.

 

PAST MEDICAL HISTORY:  Significant for:

1.  NANCY.

2.  Obesity.

3.  Gout.

4.  Nonobstructive coronary artery disease.

5.  Anxiety.

6.  Nephrolithiasis.

7.  GERD.

 

PAST SURGICAL HISTORY:

1.  He has had a heart catheterization and the report of that, I will refer you 
for further details, but in short, he did have a proximal portion of the LAD 
narrowing approximately 15% to 20% and at the second diagonal branch, there was 
narrowing of 40% to 45%.

2.  He has had knee arthroscopy.

3.  He has had carpal tunnel repair.

 

MEDICATIONS:  Home meds include:

1.  Klonopin 1 mg p.o. b.i.d. as needed.

2.  Prilosec 20 mg daily.

3.  Allopurinol 300 mg daily.

 

ALLERGIES TO MEDICATIONS:  Include no known drug allergies.

 

FAMILY HISTORY:  He says both his parents are healthy.  He specifically denied 
them having any coronary artery disease.  No heart attacks at young ages and no 
diabetes, strokes, or cancers.

 

SOCIAL HISTORY:  He is a nonsmoker.  He rarely drinks alcohol.  Surrogate 
decision maker is his mother and sister.

 

REVIEW OF SYSTEMS:  Again, there is no documented fever.  He did admit to 
feeling warm last night.  Denies having any significant weight change.  There 
is no double vision.  He denies having any ear discharge.  He denies having any 
rhinorrhea. Denies having any sore throat.  He does admit to having chest 
discomfort.  There is no orthopnea, there is no nocturnal dyspnea.  He denies 
having any abdominal pain. There was no nausea or vomiting.  No dysuria, no 
frequency.  No seizure, no loss of consciousness.  No pruritus and no skin 
ulcerations.  Review of 14 systems completed, all others negative.

 

                               PHYSICAL EXAMINATION

 

GENERAL:  At this time, Mr. Pfeiffer is a 47-year-old male patient, he is sitting 
in the ED stretcher.  He does not appear to be in any acute distress.

 

VITAL SIGNS:  Blood pressure 121/59, pulse 67, respirations 14, O2 sat 96%, 
temperature 97.6.

 

HEENT:  Head is atraumatic and normocephalic.  Eyes:  EOMs are intact.  Sclerae 
anicteric and not pale.  Throat:  Oral mucosa appears to be moist.  No 
oropharyngeal erythema.

 

NECK:  Supple.

 

LUNGS:  Clear to auscultation bilaterally.  No wheezes, rales, or rhonchi.

 

HEART:  Sounds S1 and S2.  Regular rate and rhythm.  He had no murmurs, rubs, 
or gallops.

 

ABDOMEN:  Soft, it was flat, nontender.  Bowel sounds are present.

 

EXTREMITIES:  Pulses were 2+ throughout.  He is moving all four extremities 
with 5/5 strength.  He had no peripheral edema noted.  No calf tenderness noted.

 

NEUROLOGIC:  He is awake, he is alert, he is oriented x3.  His tongue is 
midline.  were equal.  He had no gross focal deficits.

 

SKIN:  Intact.

 

 DIAGNOSTIC STUDIES/LAB DATA:  Today, WBC of 4.3, RBC of 5.37, hemoglobin of 
16.3, hematocrit of 48, platelet count of 185.  His sodium was 137, potassium 
is  chloride 103, bicarb 29, BUN was 14, creatinine 1.06, glucose 90, lactate 
1.3, calcium 9.4.  Total bili 0.6, AST 43, ALT 53, alk phos 73.  Troponin 0.  
His albumin was 4.3.

 

The patient did have a chest x-ray obtained today.  Under my review, I did not 
appreciate any acute infiltrates or effusions and no cardiomegaly, awaiting 
official radiology report.

 

He did have an EKG today at 10:51, which showed a normal sinus rhythm, rate of 
76. No ST elevations or T-wave inversions were noted.  He does have an EKG, 
which was done at 11:55 and apparently wrong patient information was noted.  
This is not the actual patient's EKG that is in the chart, so I am disregarding 
that EKG.  That is going to be fixed by IS, but the last EKG at 10:55 is his 
and again no ST elevations or T-wave inversions were noted.  Again, old medical 
records were reviewed.

 

He did have heart catheterization in 2013, which showed a 15% to 20% narrowing 
of the proximal portion of the LAD and he also had a 40% narrowing of the 
second diagonal branch.  Old medical records were reviewed.

 

ASSESSMENT AND PLAN:  Mr. Pfeiffer is a 47-year-old male patient coming into ED 
today with complaints of chest discomfort that has been going on intermittently 
since 9:30 this morning.  It has been nonexertional.  We were asked to evaluate 
for admission.  He will be admitted under observation status for:

 

1.  Chest pain.  Again, certainly this could be acute coronary artery syndrome, 
although I question if he does have any other disease processes such as a viral 
illness causing a costochondritis as he did state initially he was feeling weak
, malaise.  So, I think at this point given his risk factors, he needs serial 
troponins.  He needs a stress test to be updated given he does have 
nonobstructive coronary artery disease.  In addition to this, he does need 
lipid panel, A1c obtained as well and serial EKGs, and I have started him on a 
baby aspirin.  He received a full aspirin here in the ED for the time being.  
In the outpatient setting, we may want to consider statin therapy in addition 
of a beta blocker because of the nonobstructive coronary artery disease, but I 
will defer that to the primary, Dr. Grewal, for further recommendations.  I am 
also checking an ESR and CRP and flu swab as well and a D-dimer given the 
recent travel, although I suspect pulmonary embolism less likely as he is not 
complaining of any shortness of breath. If his D-dimer is positive, we will get 
CTA.

2.  Obstructive sleep apnea.  I have ordered CPAP for him.

3.  Obesity.  He has lost 60 pounds.  He can continue with his current 
lifestyle modifications.

4.  Gout.  Continue on allopurinol.

5.  Anxiety.  Continue meds as prescribed.

6.  History of nephrolithiasis.  Not an active issue.

7.  Gastroesophageal reflux disease.  Continue PPI therapy.

8.  DVT prophylaxis.  He will be placed on heparin subcu.

9.  Code status.  Full code.

10.  Fluids, electrolytes, and nutrition.  He can have a heart-healthy diet and 
then n.p.o. after midnight.

 

TIME SPENT:  On the admission was 60 minutes, greater than half the time was 
spent face-to-face with the patient obtaining my history and physical; the 
other half the time was spent going over the plan of care with the patient and 
implementing plan of care.

 

I did discuss the plan of care with my attending, Dr. Torre; she is in 
agreement.

 

 ____________________________________ EZIO HIDALGO, LEOPOLDO

 

[id number]/CPS #: [id number]

AMITA

## 2018-03-21 VITALS — DIASTOLIC BLOOD PRESSURE: 62 MMHG | SYSTOLIC BLOOD PRESSURE: 116 MMHG

## 2018-03-21 LAB
BASOPHILS # BLD AUTO: 0 10^3/UL (ref 0–0.2)
EOSINOPHIL # BLD AUTO: 0.1 10^3/UL (ref 0–0.6)
HCT VFR BLD AUTO: 48 % (ref 42–52)
HGB BLD-MCNC: 16.5 G/DL (ref 14–18)
LYMPHOCYTES # BLD AUTO: 1.7 10^3/UL (ref 1–4.8)
MCH RBC QN AUTO: 31 PG (ref 27–31)
MCHC RBC AUTO-ENTMCNC: 35 G/DL (ref 31–36)
MCV RBC AUTO: 89 FL (ref 80–94)
MONOCYTES # BLD AUTO: 0.5 10^3/UL (ref 0–0.8)
NEUTROPHILS # BLD AUTO: 3.7 10^3/UL (ref 1.5–7.7)
NRBC # BLD AUTO: 0 10^3/UL
NRBC BLD QL AUTO: 0.1
PLATELET # BLD AUTO: 192 10^3/UL (ref 150–450)
RBC # BLD AUTO: 5.35 10^6/UL (ref 4–5.4)
WBC # BLD AUTO: 6 10^3/UL (ref 3.5–10.8)

## 2018-03-21 RX ADMIN — HEPARIN SODIUM SCH: 5000 INJECTION INTRAVENOUS; SUBCUTANEOUS at 05:35

## 2018-03-21 RX ADMIN — HEPARIN SODIUM SCH: 5000 INJECTION INTRAVENOUS; SUBCUTANEOUS at 14:10

## 2018-03-21 NOTE — RAD
Edited for charges.



INDICATION:  Chest pain.



COMPARISON:  Comparison is made with prior study from March 29, 2014.



Technique: A single day myocardial perfusion stress study was performed. 
Initially a

resting study was performed. The patient was given an intravenous injection of 
11.0 mCi of

technetium 99m tetrofosmin and and the heart was imaged in multiple projections.



The patient returned later in the day and under the direction of Dr. Pearce, 
the patient

was exercised to a peak heart rate of 156 beats per minute which was 88% of the 
maximum

predicted heart rate. Subsequently the patient was given intravenous injection 
of 25.6 mCi

of technetium 99m tetrofosmin and the heart was imaged in multiple projections.



Images were reconstructed in the axial, sagittal and coronal planes and in a 3-
D format.



FINDINGS: The left ventricular ejection fraction is calculated to be 52% which 
has

decreased from the prior study and was previously calculated to be 65%.



Review of the images demonstrates a small area of decreased activity in the 
inferior wall

on the post exercise images which appears improved on the resting set of images 
suggestive

of a small area of ischemia. No other focal myocardial perfusion defects are 
seen.



IMPRESSION:  

1. FINDINGS SUGGESTIVE OF A SMALL AREA OF ISCHEMIA IN THE INFERIOR WALL.

2. LEFT VENTRICULAR EJECTION FRACTION IS CALCULATED TO BE 52% WHICH HAS 
DECREASED FROM THE

PRIOR STUDY.



ASSESSMENT:  Low risk.  



Based on imaging criteria from ACC/AHA 2002 Guideline Update for the Management 
of

Patients With Chronic Stable Angina Table 23. Noninvasive Risk Stratification.



MTDD

## 2018-03-22 NOTE — DS
CC:  Dr. Grewal; Dr. Anderson; Dr. Anders; Dr. Prieto; Dr. Aguilar

 

DISCHARGE SUMMARY:

 

DATE OF ADMISSION:  03/20/18

 

DATE OF DISCHARGE:  03/21/18

 

PRIMARY CARE PROVIDER:  Dr. Grewal.

 

DISCHARGE DIAGNOSES:

1.  Chest pain with low  probability cardiac stress test obtained on 03/21/18, 
which showed small area of reversible ischemia at the inferior wall.

2.  Dyslipidemia.

 

SECONDARY DIAGNOSES:

1.  Obstructive sleep apnea.

2.  Obesity.

3.  Gout.

4.  History of nonobstructive coronary artery disease with cardiac 
catheterization in 2013, which showed minimal coronary artery disease apart 
from second diagonal branch of 40% to 45% stenosis.

5.  Anxiety.

6.  Nephrolithiasis.

7.  Gastroesophageal reflux disease.

 

MEDICATIONS AT DISCHARGE:  Include:

 

1.  Allopurinol 300 mg daily.

2.  Norvasc 2.5 mg daily.

3.  Aspirin 81 mg daily.

4.  Lipitor 10 mg daily.

5.  Clonazepam 1 mg b.i.d. p.r.n.

6.  Omeprazole 20 mg daily.

 

LABORATORY DATA AND STUDIES PERFORMED DURING THE HOSPITAL STAY:  Included:

 

The patient's ESR was 4.

 

Triglycerides were 164, total cholesterol was 106, LDL of 138, and HDL of 34. 
Hemoglobin A1c was 5.7.

 

Cardiac stress test, the nuclear portion was read as low risk.  Findings 
suggestive of a small area of ischemia in the inferior wall.  The left 
ventricular ejection fraction is estimated at 52%, which was decreased from 
prior study in 2014.

 

The patient's treadmill portion of the stress test was unremarkable with no EKG 
changes and no symptoms of CP when exercising.

 

Please note that the patient's heart rate had been documented in the 50s 
throughout his hospital stay.

 

HOSPITALIZATION COURSE:  Sara Pfeiffer is a 47-year-old male with history of 
obesity, who had been taking care of himself and exercising well and he 
presented to the hospital complaining of chest pain when he was actually 
working as a .  He stated that prior to the occurrence of chest pain
, he exercised on a treadmill without any problems.  He otherwise has no 
problems with dyspnea on exertion or chest pain on exertion previously.

 

The patient had a cardiac catheterization in 2013 by Dr. Anderson.  He followed 
up with Dr. Anderson as outpatient, but Dr. Anderson recommended for him to follow 
up with Dr. Anders, which apparently he did not do.

 

The patient's troponins throughout his hospital stay were at 0.  He was noted 
to have mild dyslipidemia with LDL of 138 and was started on Lipitor.  His 
cardiac stress test showed possibility of small area of reversible ischemia.  I 
discussed the case with Dr. Aguilar, who is the cardiologist on call.  It was 
recommended for the patient to be placed on antianginal agent and calcium 
channel blocker with Norvasc 2.5 mg daily is going to be started.  The patient 
also was started on Lipitor and placed on baby aspirin.  He was advised not to 
perform strenuous exercise and to follow up with the cardiologist.  I spoke 
with Dr. Anderson's office who recommended for me to call Dr. Anders's office.  
Unfortunately, Dr. Anders is away on vacation at the beginning of April.  At 
this point, the patient was scheduled with Dr. Prieto for a followup on 04/16
/18 at 8:40 a.m. in the morning.

 

The patient is also recommended to follow up with Dr. Grewal, his primary care 
provider, in approximately 4 to 7 days.

 

PHYSICAL EXAMINATION AT THE TIME OF DISCHARGE:  Blood pressure 116/62, heart 
rate of 58 and regular, respiratory rate of 16, oxygen saturation 97% on room 
air, temperature 97.7.  General:  The patient is a very pleasant 47-year-old 
male who is in no acute distress.  Alert, awake, and oriented x3.  HEENT:  Head 
atraumatic, normocephalic.  Eyes:  Pupils are equal, reactive to light and 
accommodation. Oropharynx clear.  Mucosa moist.  Neck:  Supple.  No JVD.  No 
bruits bilaterally. Cardiovascular:  Regular rate and rhythm.  No murmur.  
Respiratory:  Clear to auscultation bilaterally.  Abdomen:  Soft, nontender.  
Bowel sounds are present in all 4 quadrants.  Extremities:  There is no edema.  
Pulses +2 bilaterally.  There is no clubbing or cyanosis.  On neuro evaluation, 
speech clear.  Cranial nerves II through XII grossly intact.  Motor strength is 
5/5 bilaterally.

 

Please note that this is a short summary of the patient's hospitalization.  
Please refer to further medical records for details.

 

 [id number]/[facility] #: [id number]

[facility]

## 2018-03-24 NOTE — ED
Dimas HATHAWAY Gabriel scribed for Jr Garsia MD on 03/20/18 at 1110 .





HPI Chest Pain





- HPI Summary


HPI Summary: 





This patient is a 47 year old M presenting to [facility] with a chief complaint of 

intermittent CP since last night at 2000. The patient rates the pain 6/10 in 

severity and states it radiates into his left arm. Symptoms aggravated by 

movement. Patient reports general malaise, anxiety, and diaphoresis. Patient 

denies SOB, cough, chills, and fever. He states the symptoms feel flu like but 

also similar to his anxiety attacks. Pt had a cardiac catheterization in 2013 

that I reviewed. 





- History of Current Complaint


Chief Complaint: EDChestPainROMI


Time Seen by Provider: 03/20/18 11:02


Hx Obtained From: Patient


Onset/Duration: Still Present


Time of Onset: 20:00


Timing: Intermittent


Initial Severity: Moderate


Current Severity: Moderate


Pain Intensity: 6


Pain Scale Used: 0-10 Numeric


Chest Pain Location: Diffuse


Chest Pain Radiates: Yes


Chest Pain Radiates To:: Arm - left


Associated Signs and Symptoms: Positive: Negative - SOB, cough, chills, and 

fever, Other: - general malaise, anxiety, and diaphoresis





- Allergy/Home Medications


Allergies/Adverse Reactions: 


 Allergies











Allergy/AdvReac Type Severity Reaction Status Date / Time


 


No Known Allergies Allergy   Verified 03/20/18 10:44














PMH/Surg Hx/FS Hx/Imm Hx


Endocrine/Hematology History: 


   Denies: Hx Diabetes, Hx Thyroid Disease, Hx Anemia, Hx Unexplained Bleeding


Cardiovascular History: Reports: Hx Angina, Hx Syncope, Other Cardiovascular 

Problems/Disorders - cardiac cath 2013 - no blockages


   Denies: Hx Aneurysm, Hx Embolism, Hx Hypercholesterolemia, Hx Hypotension, 

Hx Hypertension, Hx Pacemaker/ICD, Hx Peripheral Vascular Disease, Hx Rheumatic 

Fever, Hx Valvular Heart Disease


Respiratory History: Reports: Hx Sleep Apnea


   Denies: Hx Asthma, Hx Chronic Obstructive Pulmonary Disease (COPD)


GI History: Reports: Hx Gastroesophageal Reflux Disease


   Denies: Hx Ulcer


 History: Reports: Hx Kidney Stones


Musculoskeletal History: Reports: Hx Back Problems, Hx Gout


Sensory History: Reports: Other Sensory Impairments - finger tingling histoy 

carpal tunnel


   Denies: Hx Hearing Aid


Opthamlomology History: Reports: Other Sensory Impairments - finger tingling 

histoy carpal tunnel


Neurological History: Reports: Hx Migraine, Other Neuro Impairments/Disorders - 

PAIN CLINIC PATIENT


Psychiatric History: Reports: Hx Anxiety, Hx Depression


   Denies: Hx Panic Disorder





- Surgical History


Surgery Procedure, Year, and Place: left knee arthoscopy; wisdom teeth; left 

ureteral stent for renal calculus.  ***METALLIC ARTIFACT ADJACENT TO RT FRONTAL 

BONE (SKULL) CLEARED.  ***BY DR HERNANDEZ VIA SKULL X-RAYS 9/29/15.


Hx Anesthesia Reactions: No





- Immunization History


Date of Tetanus Vaccine: Unsure


Date of Influenza Vaccine: 2013


Infectious Disease History: No


Infectious Disease History: 


   Denies: Hx Hepatitis, Hx Human Immunodeficiency Virus (HIV), Traveled 

Outside the [place] in Last 30 Days





- Family History


Known Family History: 


   Negative: Cardiac Disease, Hypertension





- Social History


Occupation: Employed Full-time


Lives: With Family


Alcohol Use: None


Hx Substance Use: No


Substance Use Type: Reports: None


Hx Tobacco Use: No


Smoking Status (MU): Light Every Day Tobacco Smoker


Type: Smokeless Tobacco


Amount Used/How Often: 1 can per week


Have You Smoked in the Last Year: No





Review of Systems


Positive: Skin Diaphoresis, Other - general malaise, .  Negative: Fever, Chills


Negative: Erythema


Negative: Sore Throat


Positive: Chest Pain


Negative: Shortness Of Breath, Cough


Negative: Abdominal Pain, Vomiting, Nausea


Negative: dysuria, hematuria


Negative: Myalgia, Edema


Negative: Rash


Neurological: Negative - dizziness 


Positive: Anxious


All Other Systems Reviewed And Are Negative: Yes





Physical Exam





- Summary


Physical Exam Summary: 





Constitutional: Well-developed, Well-nourished, Alert. (-) Distressed


Skin: Warm, Dry


HENT: Normocephalic; Atraumatic


Eyes: Conjunctiva normal


Neck: Musculoskeletal ROM normal neck. (-) JVD, (-) Stridor, (-) Tracheal 

deviation


Cardio: Rhythm regular, rate normal, Heart sounds normal; Intact distal pulses; 

The pedal pulses are 2+ and symmetric. Radial pulses are 2+ and symmetric. (-) 

Murmur


Pulmonary/Chest wall: Effort normal. (-) Respiratory distress, (-) Wheezes, (-) 

Rales, no  reproducible tenderness 


Abd: Soft, (-) Tenderness, (-) Distension, (-) Guarding, (-) Rebound


Musculoskeletal: (-) Edema


Lymph: (-) Cervical adenopathy


Neuro: Alert, Oriented x3


Psych: Mood and affect Normal





Triage Information Reviewed: Yes


Vital Signs On Initial Exam: 


 Initial Vitals











Temp Pulse Resp BP Pulse Ox


 


 97.6 F   83   16   163/85   98 


 


 03/20/18 10:44  03/20/18 10:44  03/20/18 10:44  03/20/18 10:44  03/20/18 10:44











Vital Signs Reviewed: Yes





Diagnostics





- Vital Signs


 Vital Signs











  Temp Pulse Resp BP Pulse Ox


 


 03/20/18 10:44  97.6 F  83  16  163/85  98














- Laboratory


Result Diagrams: 


 03/20/18 11:23





 03/20/18 11:23


Lab Statement: Any lab studies that have been ordered have been reviewed, and 

results considered in the medical decision making process.





- Radiology


  ** CXR


Radiology Interpretation Completed By: Radiologist - No active cardiopulmonary 

disease.  ED physician has reviewed this radiology report.





- EKG


  ** 10:51


Cardiac Rate: NL


EKG Rhythm: Sinus Rhythm - at 76 BPM


EKG Interpretation: No STEMI 





Chest Pain Course/Dx





- Course


Assessment/Plan: This patient is a 47 year old M [facility] 

chief complaint of intermittent CP since last night at 2000. The patient rates 

the pain 6/10 in severity and states it radiates into his left arm. Symptoms 

aggravated by movement. Patient reports general malaise, anxiety, and 

diaphoresis. Patient denies SOB, cough, chills, and fever. He states the 

symptoms feel flu like but also similar to his anxiety attacks. Pt had a 

cardiac catheterization in 2013 that I reviewed.  .  An EKG reveals NSR.  CXR 

reveals, per radiologist, No active cardiopulmonary disease.  Test results with 

no significant abnormalities.  In the ED course the patient was given ASA, NTG, 

and morphine.  We discussed patient care with Dr. Torre and they have accepted 

the patient for admission.  Patient will be admitted to [facility].  The patient is 

agreeable with this plan.





- Diagnoses


Provider Diagnoses: 


 Chest pain, unspecified








- Provider Notifications


Discussed Care Of Patient With: Nancy Torre


Time Discussed With Above Provider: 12:45


Instructed by Provider To: Admit As Observation





Discharge





- Sign-Out/Discharge


Documenting (check all that apply): Discharge





- Discharge Plan


Condition: Stable


Disposition: ADMITTED TO [facility]





The documentation as recorded by the Dimas trevizo Gabriel accurately reflects 

the service I personally performed and the decisions made by me, Jr Garsia MD.